# Patient Record
Sex: MALE | Race: WHITE | NOT HISPANIC OR LATINO | Employment: OTHER | URBAN - METROPOLITAN AREA
[De-identification: names, ages, dates, MRNs, and addresses within clinical notes are randomized per-mention and may not be internally consistent; named-entity substitution may affect disease eponyms.]

---

## 2019-02-04 NOTE — PATIENT DISCUSSION
New Prescription: doxycycline hyclate (doxycycline hyclate): tablet: 50 mg 1 tablet twice a day by mouth 02-

## 2019-02-04 NOTE — PATIENT DISCUSSION
New Prescription: TobraDex (tobramycin-dexamethasone): ointment: 0.3-0.1% a small amount twice a day into affected eye 02-

## 2019-02-04 NOTE — PATIENT DISCUSSION
HORDEOLUM EXTERNAL OS: PATIENT EDUCATION RX WARM COMPRESSES 4-6 TIMES A DAY WITH DIGITAL MASSAGE. RX EMYCIN CODY TO AFFECTED AREA BID.

## 2019-02-18 NOTE — PATIENT DISCUSSION
New Prescription: Maxitrol (neomycin-polymyxin-dexameth): ointment: 3.5-10,000-0.1 mg-unit/g-% a small amount twice a day on eyelid 02-

## 2019-02-18 NOTE — PATIENT DISCUSSION
Continue: TobraDex (tobramycin-dexamethasone): ointment: 0.3-0.1% a small amount twice a day into affected eye 02-

## 2019-03-12 NOTE — PATIENT DISCUSSION
Continue: Maxitrol (neomycin-polymyxin-dexameth): ointment: 3.5-10,000-0.1 mg-unit/g-% a small amount twice a day on eyelid 02-

## 2019-03-12 NOTE — PATIENT DISCUSSION
HORDEOLUM INTERNUM OS: PATIENT EDUCATION RX WARM COMPRESSES 4-6 TIMES A DAY WITH DIGITAL MASSAGE. RX EMYCIN CODY TO AFFECTED AREA BID.

## 2019-04-04 NOTE — PATIENT DISCUSSION
Chalazion -Healing nicely, would not recommend removal at this time. advised pt that if she feels another bump coming up to use Warm compresses. Call the office if questions or concerns.  Continue antibiotic and compresses for another week

## 2019-07-17 NOTE — PATIENT DISCUSSION
MILD DRY EYES: PRESCRIBED OTC ARTIFICIAL TEARS BID - QID, OU RETURN FOR FOLLOW-UP AS SCHEDULED OR SOONER IF SYMPTOMS WORSEN. Anesthesia Type: bacteriostatic saline

## 2019-07-17 NOTE — PATIENT DISCUSSION
GONIO TESTING HAS BEEN ORDERED AND PERFORMED TODAY TO EVALUATE THE ANGLES FOR THE DIAGNOSIS OF GLAUCOMA SUSPECT. ANGLES ARE OPEN TODAY.

## 2019-11-26 NOTE — PATIENT DISCUSSION
Stopped Today: Maxitrol (neomycin-polymyxin-dexameth): ointment: 3.5-10,000-0.1 mg-unit/g-% a small amount twice a day on eyelid 02-

## 2019-11-26 NOTE — PATIENT DISCUSSION
Stopped Today: TobraDex (tobramycin-dexamethasone): ointment: 0.3-0.1% a small amount twice a day into affected eye 02-

## 2022-04-08 ENCOUNTER — CONSULTATION/EVALUATION (OUTPATIENT)
Dept: URBAN - METROPOLITAN AREA CLINIC 35 | Facility: CLINIC | Age: 77
End: 2022-04-08

## 2022-04-08 DIAGNOSIS — H35.723: ICD-10-CM

## 2022-04-08 DIAGNOSIS — H25.813: ICD-10-CM

## 2022-04-08 PROCEDURE — V2799PMN IMPRIMIS PRED-MOXI-NEPAF 5ML

## 2022-04-08 PROCEDURE — 92136TC INTERFEROMETRY - TECHNICAL COMPONENT

## 2022-04-08 PROCEDURE — 92004 COMPRE OPH EXAM NEW PT 1/>: CPT

## 2022-04-08 ASSESSMENT — VISUAL ACUITY
OD_SC: J12
OS_SC: J12
OS_CC: 20/60+1
OS_AM: 20/30
OS_CC: J6
OS_SC: 20/200
OD_CC: J8
OS_BAT: 20/100 WITH MR
OD_SC: 20/200
OD_BAT: 20/200 WITH MR
OD_RAM: 20/30
OD_CC: 20/60+2

## 2022-04-08 ASSESSMENT — TONOMETRY
OD_IOP_MMHG: 12
OS_IOP_MMHG: 12

## 2022-05-05 ENCOUNTER — SURGERY/PROCEDURE (OUTPATIENT)
Dept: URBAN - METROPOLITAN AREA CLINIC 35 | Facility: CLINIC | Age: 77
End: 2022-05-05

## 2022-05-05 DIAGNOSIS — H25.813: ICD-10-CM

## 2022-05-05 PROCEDURE — 66984 XCAPSL CTRC RMVL W/O ECP: CPT

## 2022-05-06 ENCOUNTER — POST-OP (OUTPATIENT)
Dept: URBAN - METROPOLITAN AREA CLINIC 35 | Facility: CLINIC | Age: 77
End: 2022-05-06

## 2022-05-06 DIAGNOSIS — Z96.1: ICD-10-CM

## 2022-05-06 DIAGNOSIS — H25.812: ICD-10-CM

## 2022-05-06 PROCEDURE — 99024 POSTOP FOLLOW-UP VISIT: CPT

## 2022-05-06 ASSESSMENT — VISUAL ACUITY
OD_SC: 20/100
OD_PH: 20/60

## 2022-05-06 ASSESSMENT — TONOMETRY: OD_IOP_MMHG: 20

## 2022-05-09 ENCOUNTER — POST-OP (OUTPATIENT)
Dept: URBAN - METROPOLITAN AREA CLINIC 35 | Facility: CLINIC | Age: 77
End: 2022-05-09

## 2022-05-09 DIAGNOSIS — H25.812: ICD-10-CM

## 2022-05-09 DIAGNOSIS — Z96.1: ICD-10-CM

## 2022-05-09 PROCEDURE — 99024 POSTOP FOLLOW-UP VISIT: CPT

## 2022-05-09 ASSESSMENT — VISUAL ACUITY
OD_SC: 20/70
OU_SC: 20/80
OD_PH: 20/60+1
OS_SC: 20/200
OS_PH: 20/70

## 2022-05-12 ENCOUNTER — SURGERY/PROCEDURE (OUTPATIENT)
Dept: URBAN - METROPOLITAN AREA CLINIC 35 | Facility: CLINIC | Age: 77
End: 2022-05-12

## 2022-05-12 DIAGNOSIS — H25.812: ICD-10-CM

## 2022-05-12 PROCEDURE — 66984 XCAPSL CTRC RMVL W/O ECP: CPT

## 2022-05-13 ENCOUNTER — POST-OP (OUTPATIENT)
Dept: URBAN - METROPOLITAN AREA CLINIC 35 | Facility: CLINIC | Age: 77
End: 2022-05-13

## 2022-05-13 DIAGNOSIS — Z96.1: ICD-10-CM

## 2022-05-13 PROCEDURE — 99024 POSTOP FOLLOW-UP VISIT: CPT

## 2022-05-13 ASSESSMENT — TONOMETRY
OS_IOP_MMHG: 23
OD_IOP_MMHG: 15

## 2022-05-13 ASSESSMENT — VISUAL ACUITY
OS_PH: 20/50
OD_PH: 20/50
OD_SC: 20/70
OU_SC: 20/60
OS_SC: 20/80

## 2022-06-06 ENCOUNTER — POST-OP (OUTPATIENT)
Dept: URBAN - METROPOLITAN AREA CLINIC 35 | Facility: CLINIC | Age: 77
End: 2022-06-06

## 2022-06-06 DIAGNOSIS — Z96.1: ICD-10-CM

## 2022-06-06 DIAGNOSIS — H35.723: ICD-10-CM

## 2022-06-06 PROCEDURE — 99024 POSTOP FOLLOW-UP VISIT: CPT

## 2022-06-06 PROCEDURE — 92134 CPTRZ OPH DX IMG PST SGM RTA: CPT

## 2022-06-06 ASSESSMENT — TONOMETRY
OS_IOP_MMHG: 17
OD_IOP_MMHG: 18

## 2022-06-06 ASSESSMENT — VISUAL ACUITY
OD_SC: J10
OS_SC: J4
OS_SC: 20/80+1
OS_PH: 20/60-1
OU_SC: 20/60-2
OD_SC: 20/70
OU_SC: J4

## 2025-02-19 NOTE — PATIENT DISCUSSION
GLAUCOMA SUSPECT, OU :  POSITIVE FAMILY HISTORY. RETURN FOR FOLLOW UP AS SCHEUDLED. FAMILY HISTORY:  Father  Still living? No  Family history of acute myocardial infarction, Age at diagnosis: Age Unknown  Family history of lung cancer, Age at diagnosis: Age Unknown    Mother  Still living? Unknown  Family history of non-Hodgkin's lymphoma, Age at diagnosis: Age Unknown    Sibling  Still living? No  Family history of diabetes mellitus, Age at diagnosis: Age Unknown